# Patient Record
Sex: MALE | Race: WHITE
[De-identification: names, ages, dates, MRNs, and addresses within clinical notes are randomized per-mention and may not be internally consistent; named-entity substitution may affect disease eponyms.]

---

## 2019-11-06 ENCOUNTER — HOSPITAL ENCOUNTER (EMERGENCY)
Dept: HOSPITAL 63 - ER | Age: 39
Discharge: HOME | End: 2019-11-06
Payer: COMMERCIAL

## 2019-11-06 VITALS — DIASTOLIC BLOOD PRESSURE: 65 MMHG | SYSTOLIC BLOOD PRESSURE: 123 MMHG

## 2019-11-06 VITALS — BODY MASS INDEX: 27.71 KG/M2 | HEIGHT: 72 IN | WEIGHT: 204.59 LBS

## 2019-11-06 DIAGNOSIS — J40: Primary | ICD-10-CM

## 2019-11-06 DIAGNOSIS — F17.210: ICD-10-CM

## 2019-11-06 LAB
ALBUMIN SERPL-MCNC: 3.9 G/DL (ref 3.4–5)
ALBUMIN/GLOB SERPL: 1.3 {RATIO} (ref 1–1.7)
ALP SERPL-CCNC: 58 U/L (ref 46–116)
ALT SERPL-CCNC: 43 U/L (ref 16–63)
ANION GAP SERPL CALC-SCNC: 9 MMOL/L (ref 6–14)
AST SERPL-CCNC: 29 U/L (ref 15–37)
BASOPHILS # BLD AUTO: 0.1 X10^3/UL (ref 0–0.2)
BASOPHILS NFR BLD: 1 % (ref 0–3)
BILIRUB SERPL-MCNC: 0.3 MG/DL (ref 0.2–1)
BUN/CREAT SERPL: 10 (ref 6–20)
CA-I SERPL ISE-MCNC: 14 MG/DL (ref 8–26)
CALCIUM SERPL-MCNC: 9 MG/DL (ref 8.5–10.1)
CHLORIDE SERPL-SCNC: 107 MMOL/L (ref 98–107)
CO2 SERPL-SCNC: 28 MMOL/L (ref 21–32)
CREAT SERPL-MCNC: 1.4 MG/DL (ref 0.7–1.3)
EOSINOPHIL NFR BLD: 0.4 X10^3/UL (ref 0–0.7)
EOSINOPHIL NFR BLD: 4 % (ref 0–3)
ERYTHROCYTE [DISTWIDTH] IN BLOOD BY AUTOMATED COUNT: 12.6 % (ref 11.5–14.5)
GFR SERPLBLD BASED ON 1.73 SQ M-ARVRAT: 56.7 ML/MIN
GLOBULIN SER-MCNC: 3.1 G/DL (ref 2.2–3.8)
GLUCOSE SERPL-MCNC: 117 MG/DL (ref 70–99)
HCT VFR BLD CALC: 45.4 % (ref 39–53)
HGB BLD-MCNC: 15.8 G/DL (ref 13–17.5)
LYMPHOCYTES # BLD: 3.5 X10^3/UL (ref 1–4.8)
LYMPHOCYTES NFR BLD AUTO: 37 % (ref 24–48)
MCH RBC QN AUTO: 33 PG (ref 25–35)
MCHC RBC AUTO-ENTMCNC: 35 G/DL (ref 31–37)
MCV RBC AUTO: 95 FL (ref 79–100)
MONO #: 0.6 X10^3/UL (ref 0–1.1)
MONOCYTES NFR BLD: 6 % (ref 0–9)
NEUT #: 5 X10^3UL (ref 1.8–7.7)
NEUTROPHILS NFR BLD AUTO: 52 % (ref 31–73)
PLATELET # BLD AUTO: 232 X10^3/UL (ref 140–400)
POTASSIUM SERPL-SCNC: 3.6 MMOL/L (ref 3.5–5.1)
PROT SERPL-MCNC: 7 G/DL (ref 6.4–8.2)
RBC # BLD AUTO: 4.79 X10^6/UL (ref 4.3–5.7)
SODIUM SERPL-SCNC: 144 MMOL/L (ref 136–145)
WBC # BLD AUTO: 9.6 X10^3/UL (ref 4–11)

## 2019-11-06 PROCEDURE — 71046 X-RAY EXAM CHEST 2 VIEWS: CPT

## 2019-11-06 PROCEDURE — 82553 CREATINE MB FRACTION: CPT

## 2019-11-06 PROCEDURE — 99285 EMERGENCY DEPT VISIT HI MDM: CPT

## 2019-11-06 PROCEDURE — 84484 ASSAY OF TROPONIN QUANT: CPT

## 2019-11-06 PROCEDURE — 36415 COLL VENOUS BLD VENIPUNCTURE: CPT

## 2019-11-06 PROCEDURE — 85379 FIBRIN DEGRADATION QUANT: CPT

## 2019-11-06 PROCEDURE — 80053 COMPREHEN METABOLIC PANEL: CPT

## 2019-11-06 PROCEDURE — 85025 COMPLETE CBC W/AUTO DIFF WBC: CPT

## 2019-11-06 PROCEDURE — 93005 ELECTROCARDIOGRAM TRACING: CPT

## 2019-11-06 NOTE — EKG
[facility] [address], [place] [address]

Test Date:    2019               Test Time:    17:54:16

Pat Name:     EDUARDO BUTLER                 Department:   

Patient ID:   [id number]           Room:          

Gender:       M                        Technician:   PEDRITO

:          1980               Requested By: SILVIA BUENROSTRO

Order Number: [id number]            Reading MD:     

                                 Measurements

Intervals                              Axis          

Rate:         75                       P:            43

FL:           202                      QRS:          -8

QRSD:         88                       T:            20

QT:           362                                    

QTc:          407                                    

                           Interpretive Statements

SINUS RHYTHM

LEFTWARD AXIS

OTHERWISE NORMAL ECG

RI6.01

No previous ECG available for comparison

## 2019-11-06 NOTE — PHYS DOC
Past History


Past Medical History:  No Pertinent History


Smoking:  Cigarettes


Drug Use:  None





Adult General


Chief Complaint


Chief Complaint:  SHORTNESS OF BREATH





HPI


HPI


Patient is a 39-year-old male who presents to the emergency department for 

evaluation. He states that for the past several weeks he has had a cough 

productive of yellowish sputum, and [facility], and 

was diagnosed with "walking pneumonia". He was given doxycycline as well as an 

albuterol inhaler but states he has not felt any better. He states that this 

morning he developed some sharp pleuritic left-sided and right-sided chest pain,

and is felt some shortness of breath. He denies any exertional chest pain, 

dizziness or lightheadedness. Coughing seems to precipitate a headache, but he 

denies any other painful areas. He has not had any nausea, vomiting, or any 

definite fevers, but he states he has felt subjectively warm on and off for the 

past several weeks. There are no alleviating or exacerbating factors to his 

symptoms.





Review of Systems


Review of Systems





Constitutional: Denies lethargy or chills []


Eyes: Denies change in visual acuity, redness, or eye pain []


HENT: Denies nasal congestion or sore throat []


Respiratory: No additional information not addressed in HPI []


Cardiovascular: No additional information not addressed in HPI []


GI: Denies abdominal pain, nausea, vomiting, bloody stools or diarrhea []


: Denies dysuria or hematuria []


Musculoskeletal: Denies back pain or joint pain []


Integument: Denies rash or skin lesions []


Neurologic: Denies  focal weakness or sensory changes []


Endocrine: Denies polyuria or polydipsia []





All other systems were reviewed and found to be within normal limits, except as 

documented in this note.





Allergies


Allergies





Allergies








Coded Allergies Type Severity Reaction Last Updated Verified


 


  No Known Drug Allergies    11/6/19 No











Physical Exam


Physical Exam


PHYSICAL EXAM:





CONSTITUTIONAL: Well developed, well nourished


HEAD: normocephalic, atraumatic


EENT: PERRL, EOMI. Conjunctivae normal color, sclerae non-icteric; moist mucous 

membranes.


NECK: Supple, non-tender; no meningismus.


LUNGS: Lungs CTA, breathing even and unlabored. Normal air movement.


HEART: Regular rate and rhythm, no murmur


CHEST: No deformity; non-tender


ABDOMEN: The abdomen is soft, and non-tender, no masses or bruits.


EXTREM: Normal ROM; no deformity, no calf tenderness. Normal pulses palpable in 

all extremities. There is no pedal edema.


SKIN: No rash; no diaphoresis


NEURO: Alert; normal speech and cognition; CN's grossly intact; strength grossly

 intact without focal deficit.


BACK: No CVA TTP.





Current Patient Data


Lab Results





Laboratory Tests








Test


 11/6/19


17:57


 


White Blood Count 9.6 x10^3/uL 


 


Red Blood Count 4.79 x10^6/uL 


 


Hemoglobin 15.8 g/dL 


 


Hematocrit 45.4 % 


 


Mean Corpuscular Volume 95 fL 


 


Mean Corpuscular Hemoglobin 33 pg 


 


Mean Corpuscular Hemoglobin


Concent 35 g/dL 





 


Red Cell Distribution Width 12.6 % 


 


Platelet Count 232 x10^3/uL 


 


Neutrophils (%) (Auto) 52 % 


 


Lymphocytes (%) (Auto) 37 % 


 


Monocytes (%) (Auto) 6 % 


 


Eosinophils (%) (Auto) 4 % 


 


Basophils (%) (Auto) 1 % 


 


Neutrophils # (Auto) 5.0 x10^3uL 


 


Lymphocytes # (Auto) 3.5 x10^3/uL 


 


Monocytes # (Auto) 0.6 x10^3/uL 


 


Eosinophils # (Auto) 0.4 x10^3/uL 


 


Basophils # (Auto) 0.1 x10^3/uL 


 


D-Dimer (Eugenie) 0.27 mg/L 


 


Sodium Level 144 mmol/L 


 


Potassium Level 3.6 mmol/L 


 


Chloride Level 107 mmol/L 


 


Carbon Dioxide Level 28 mmol/L 


 


Anion Gap 9 


 


Blood Urea Nitrogen 14 mg/dL 


 


Creatinine 1.4 mg/dL 


 


Estimated GFR


(Cockcroft-Gault) 56.7 





 


BUN/Creatinine Ratio 10 


 


Glucose Level 117 mg/dL 


 


Calcium Level 9.0 mg/dL 


 


Total Bilirubin 0.3 mg/dL 


 


Aspartate Amino Transf


(AST/SGOT) 29 U/L 





 


Alanine Aminotransferase


(ALT/SGPT) 43 U/L 





 


Alkaline Phosphatase 58 U/L 


 


Creatine Kinase 398 U/L 


 


Creatine Kinase MB (Mass) 2.4 ng/mL 


 


Creatine Kinase MB Relative


Index 0.6 % 





 


Troponin I Quantitative < 0.017 ng/mL 


 


Total Protein 7.0 g/dL 


 


Albumin 3.9 g/dL 


 


Albumin/Globulin Ratio 1.3 








Current Medications








 Medications


  (Trade)  Dose


 Ordered  Sig/Norma


 Route


 PRN Reason  Start Time


 Stop Time Status Last Admin


Dose Admin


 


 Acetaminophen


  (Tylenol)  1,000 mg  1X  ONCE


 PO


   11/6/19 18:00


 11/6/19 18:01 DC 11/6/19 18:00














EKG


EKG


[]Normal sinus rhythm at a rate of 75 beats for minute, normal axis, normal 

intervals. There are no acute ischemic ST/T changes.





Radiology/Procedures


Radiology/Procedures


PROCEDURE: CHEST PA & LATERAL





Two-view chest dated 11/6/2019.


 


No comparison available.


 


Clinical data indication: Shortness of breath.


 


FINDINGS:


 


PA and lateral views obtained. Heart and mediastinal contours are within 


normal limits. Lungs are clear. No consolidation or pleural effusion. No 


pneumothorax. Mildly prominent perihilar linear markings, nonspecific.


 


IMPRESSION:.


 No acute radiographic abnormality.


 []





Course & Med Decision Making


Course & Med Decision Making


Pertinent Labs and Imaging studies reviewed. (See chart for details)





[]7:00 PM:Patient remains stable. I discussed test results, the need for close 

follow-up, and return precautions. I specifically discussed importance of 

smoking cessation with the patient. Vital signs including oxygen saturation 

remained stable.





Dragon Disclaimer


Dragon Disclaimer


This electronic medical record was generated, in whole or in part, using a voice

 recognition dictation system.





Departure


Departure:


Impression:  


   Primary Impression:  


   Bronchitis


Disposition:  01 HOME, SELF-CARE


Condition:  STABLE


Patient Instructions:  Bronchitis, Shortness of Breath, Smoking Cessation





Additional Instructions:  


Follow-up with the primary care provider of your choice for further evaluation. 

Return to medical care for any new or worsening, increasing trouble breathing, 

or any other concerning symptoms.











SILVIA BUENROSTRO MD            Nov 6, 2019 17:52

## 2019-11-06 NOTE — RAD
Two-view chest dated 11/6/2019.

 

No comparison available.

 

Clinical data indication: Shortness of breath.

 

FINDINGS:

 

PA and lateral views obtained. Heart and mediastinal contours are within 

normal limits. Lungs are clear. No consolidation or pleural effusion. No 

pneumothorax. Mildly prominent perihilar linear markings, nonspecific.

 

IMPRESSION:.

 No acute radiographic abnormality.

 

Electronically signed by: Arnulfo Stack MD (11/6/2019 6:18 PM) 

[facility]